# Patient Record
Sex: FEMALE | Race: WHITE
[De-identification: names, ages, dates, MRNs, and addresses within clinical notes are randomized per-mention and may not be internally consistent; named-entity substitution may affect disease eponyms.]

---

## 2018-03-27 ENCOUNTER — HOSPITAL ENCOUNTER (INPATIENT)
Dept: HOSPITAL 74 - JLDR | Age: 23
LOS: 4 days | Discharge: HOME | DRG: 540 | End: 2018-03-31
Attending: OBSTETRICS & GYNECOLOGY | Admitting: OBSTETRICS & GYNECOLOGY
Payer: COMMERCIAL

## 2018-03-27 VITALS — BODY MASS INDEX: 32.5 KG/M2

## 2018-03-27 DIAGNOSIS — Z3A.39: ICD-10-CM

## 2018-03-27 DIAGNOSIS — O34.219: Primary | ICD-10-CM

## 2018-03-27 DIAGNOSIS — Z30.2: ICD-10-CM

## 2018-03-28 PROCEDURE — 0UL70ZZ OCCLUSION OF BILATERAL FALLOPIAN TUBES, OPEN APPROACH: ICD-10-PCS | Performed by: OBSTETRICS & GYNECOLOGY

## 2018-03-28 RX ADMIN — CEFAZOLIN SODIUM SCH MLS/HR: 1 INJECTION, SOLUTION INTRAVENOUS at 17:04

## 2018-03-28 NOTE — HP
Past Medical History





- Primary Care Physician


PCP:: Eduardo La





- Admission


Chief Complaint: pregnancy 39 weeks, previous c/s , request of repeat c/s, 

sterlization


History of Present Illness: 





22 yo f  39 weeks gestation with previous c/s ,requesting repeat c/sand 

BTL , aware small failure risks associated with BTL and risk of ectopic, risks 

and ulternatives has discussed with patient, cx closed , vx -3 mi, fhr cat 1,


History Source: Patient


Limitations to Obtaining History: Language Barrier





- Past Medical History


...: 4


...Para: 3


...Term: 3


...: 0


...Spon : 0


...Induced : 0


...Multiple Gestation: 0


...LMP: 17


... Weeks Gestation by Dates: 39


...EDC by Dates: 18


...EDC by Sono: 18


Heme/Onc: Yes: Anemia





- Past Surgical History


Past Surgical History: Yes: 


Hx Myomectomy: No


Hx Transabdominal Cerclage: No





- Smoking History


Smoking history: Never smoked


Have you smoked in the past 12 months: No


Aproximately how many cigarettes per day: 0





- Alcohol/Substance Use


Hx Alcohol Use: No


History of Substance Use: reports: None





- Social History


History of Recent Travel: No





Home Medications





- Allergies


Allergies/Adverse Reactions: 


 Allergies











Allergy/AdvReac Type Severity Reaction Status Date / Time


 


No Known Allergies Allergy   Verified 18 00:18














- Home Medications


Home Medications: 


Ambulatory Orders





Prenatal Vitamins (Sjr) - 1 tab PO DAILY 16 











Review of Systems





- Review of Systems


Constitutional: reports: No Symptoms


Eyes: reports: No Symptoms


HENT: reports: No Symptoms


Cardiovascular: reports: No Symptoms


Respiratory: reports: No Symptoms


Gastrointestinal: reports: No Symptoms


Genitourinary: reports: No Symptoms


Breasts: reports: No Symptoms Reported


Musculoskeletal: reports: No Symptoms


Integumentary: reports: No Symptoms


Neurological: reports: No Symptoms


Endocrine: reports: No Symptoms


Hematology/Lymphatic: reports: No Symptoms


Psychiatric: reports: No Symptoms





Physical Exam - Maternity


Vital Signs: 


 Vital Signs











Temperature  97.7 F   18 07:00


 


Pulse Rate  102 H  18 07:00


 


Respiratory Rate  18   18 07:00


 


Blood Pressure  106/52   18 07:00


 


O2 Sat by Pulse Oximetry (%)      











Constitutional: Yes: Well Nourished, No Distress, Calm


Eyes: Yes: WNL, Conjunctiva Clear, EOM Intact


HENT: Yes: WNL, Atraumatic, Normocephalic


Neck: Yes: WNL, Supple, Trachea Midline


Cardiovascular: Yes: WNL, Regular Rate and Rhythm


Breast(s): Yes: WNL





- Abdominal Exam/OB


Fundal Height: 40


Number of Fetuses: Single


Fetal Presentation: Vertex


Contractions: No


Intensity: Unaware


Fetal Monitor Mode: External


Fetal Heart Rate Location: [facility]


Category: I


Accelerations: Uniform


Decelerations: None





- Vaginal Exam/OB


Vaginal Bleediing: No


Speculum Exam: No


Dilatation (cm): closed


Effacement (%): 0


Amniotic Membrane Status: Intact


Fetal Presentation: Vertex/Position


Fetal Station: -3





- Physical Exam


Musculoskeletal: Yes: WNL


Extremities: Yes: WNL


Edema: LLE: Trace, RLE: Trace


...Motor Strength: WNL


Psychiatric: Yes: WNL





Hemorrhage Risk Assessment





- Risk Factors


Medium Risk Factors: Yes: Prior , uterine surgery,or multiple 

laparotomies


Risk Score: 1


Risk Level: Medium Risk





Problem List





- Problems


(1) Pregnancy with 39 completed weeks gestation


Code(s): Z3A.39 - 39 WEEKS GESTATION OF PREGNANCY   





(2) Previous  section complicating pregnancy


Code(s): O34.219 - MATERNAL CARE FOR UNSP TYPE SCAR FROM PREVIOUS  DEL 

  





(3) Sterilization


Code(s): Z30.2 - ENCOUNTER FOR STERILIZATION   





Assessment/Plan





repeat c/s, BTL  , rba discussed

## 2018-03-28 NOTE — OP
DATE OF OPERATION:  2018

 

PREOPERATIVE DIAGNOSES:  Pregnancy, 39 weeks.  Three previous  sections. 

Voluntary sterilization.

 

POSTOPERATIVE DIAGNOSES:  Pregnancy, 39 weeks.  Three previous  sections. 

Voluntary sterilization

 

PROCEDURES:  Repeat low-segment, transverse  section and bilateral tubal

ligation.

 

SURGEON:  Dwayne La MD

 

FIRST ASSIST:  CARLOTTA Lim

 

ANESTHESIA:  Spinal.

 

ANESTHESIOLOGIST:  Gris Jefferson MD

 

ESTIMATED BLOOD LOSS:  500 mL.

 

FINDINGS:  A live baby boy, Apgars of 9 and 9, ROT position.

 

OPERATION:  The patient was taken to the operating room and had adequate spinal

anesthesia.  Abdomen and perineum was prepped and draped.  Pfannenstiel abdominal

skin incision was made over previous incision.  Abdominal wall was cut

layer-by-layer.  Anterior peritoneum was exposed and incised.  Upon entering the

abdominal cavity, the low uterine segment was identified and uterovesical fold of

peritoneum established.  Bladder was pushed down.  Then, with the lower blade of the

[place] retracting the pelvis, a low-transverse uterine incision was made.  Incision

extended laterally.  Amniotic sac was entered.  Clear fluid.  Head delivered from

right occiput transverse position.  Nasopharynx was suctioned and a live baby boy was

delivered.  Placenta was delivered manually.  Uterine cavity was cleaned of all

remaining tissue.  Uterine incision was closed in two layers, first layer with 0

Biosyn continuous suture, the second layer with 0 Biosyn imbricating the first layer.

 Bladder flap was closed with 0 Biosyn continuous suture.  Both tubes and ovaries

were checked and normal and no active bleeding was seen.  The right tube was grasped

with a [place] clamp.  The right tube was doubly tied with 2-0 plain.  Portion of

tube was removed.  Endosalpinx was cauterized.  The same procedure repeated for

opposite tube.  All the laparotomy pad, sponge count, instrument counts were correct.

 Peritoneum was closed with 0 Biosyn continuous suture.  Muscle was brought together

with interrupted suture of 0 Biosyn.  Fascia was closed with 0 Biosyn continuous

suture, subcutaneous fat with interrupted suture of 0 Biosyn and the skin was closed

with staples.  Patient tolerated procedure well.  Left the OR in good condition.

 

 

DWAYNE LA M.D. [id number]

DD: 2018 10:39

DT: 2018 11:18

Job #:  [id number]

## 2018-03-29 LAB
BASOPHILS # BLD: 0.3 % (ref 0–2)
DEPRECATED RDW RBC AUTO: 14.5 % (ref 11.6–15.6)
EOSINOPHIL # BLD: 0.3 % (ref 0–4.5)
HCT VFR BLD CALC: 29.4 % (ref 32.4–45.2)
HGB BLD-MCNC: 10 GM/DL (ref 10.7–15.3)
LYMPHOCYTES # BLD: 22.9 % (ref 8–40)
MCH RBC QN AUTO: 29.7 PG (ref 25.7–33.7)
MCHC RBC AUTO-ENTMCNC: 34.1 G/DL (ref 32–36)
MCV RBC: 87.1 FL (ref 80–96)
MONOCYTES # BLD AUTO: 7.7 % (ref 3.8–10.2)
NEUTROPHILS # BLD: 68.8 % (ref 42.8–82.8)
PLATELET # BLD AUTO: 111 K/MM3 (ref 134–434)
PMV BLD: 9.4 FL (ref 7.5–11.1)
RBC # BLD AUTO: 3.38 M/MM3 (ref 3.6–5.2)
WBC # BLD AUTO: 9.3 K/MM3 (ref 4–10)

## 2018-03-29 RX ADMIN — ENOXAPARIN SODIUM SCH MG: 40 INJECTION SUBCUTANEOUS at 09:39

## 2018-03-29 RX ADMIN — IBUPROFEN PRN MG: 600 TABLET, FILM COATED ORAL at 21:25

## 2018-03-29 RX ADMIN — CEFAZOLIN SODIUM SCH MLS/HR: 1 INJECTION, SOLUTION INTRAVENOUS at 02:04

## 2018-03-29 NOTE — PN
Post Partum Progress Note





- Subjective


Subjective: 





22 yo Para 4, status post repeat , seen and evaluated.


She's lying in bed; she c/o incision pain.


Post Partum Day: 1


Type of Delivery: Repeat C/S


Vital Signs: 


 Vital Signs











Temperature  98.0 F   18 01:17


 


Pulse Rate  74   18 01:17


 


Respiratory Rate  20   18 05:00


 


Blood Pressure  98/40   18 01:17


 


O2 Sat by Pulse Oximetry (%)  100   18 11:25











Breast Exam: Yes: Soft


Uterus: Yes: Fundus Firm


Incision: Yes: Dressing dry and intact


Abdomen/GI: Yes: Abdomen soft


Lochia: Yes: Rubra


Lochia, amount: Small


Extremities: Yes: Calves non-tender


Perineum: Yes: Intact


Activity: Other (She's lying in bed)





Problem List





- Problems


(1) Status post repeat low transverse  section


Code(s): Z98.891 - HISTORY OF UTERINE SCAR FROM PREVIOUS SURGERY   





Assessment/Plan





Status post repeat 


Stable


Ambulation


Analgesia as needed


Continue routine post op care

## 2018-03-29 NOTE — PN
Progress Note (short form)





- Note


Progress Note: 





Anesthesia postop note





22 y/o F s/p spinal anesthesia/ duramorph for  section


POD#1, vss, aaox3, pain well controlled, sensory motor intact distally


No anesthesia complications.

## 2018-03-30 RX ADMIN — ENOXAPARIN SODIUM SCH MG: 40 INJECTION SUBCUTANEOUS at 10:01

## 2018-03-30 RX ADMIN — IBUPROFEN PRN MG: 600 TABLET, FILM COATED ORAL at 08:47

## 2018-03-30 RX ADMIN — SIMETHICONE CHEW TAB 80 MG PRN MG: 80 TABLET ORAL at 21:12

## 2018-03-30 RX ADMIN — IBUPROFEN PRN MG: 600 TABLET, FILM COATED ORAL at 21:12

## 2018-03-30 RX ADMIN — SIMETHICONE CHEW TAB 80 MG PRN MG: 80 TABLET ORAL at 15:10

## 2018-03-30 RX ADMIN — IBUPROFEN PRN MG: 600 TABLET, FILM COATED ORAL at 15:11

## 2018-03-30 RX ADMIN — SIMETHICONE CHEW TAB 80 MG PRN MG: 80 TABLET ORAL at 08:46

## 2018-03-30 NOTE — PN
Progress Note (short form)





- Note


Progress Note: 





pod 2 s/p repeat c/s , BTL 


c/o mild incisional pain, passing gas


 CBC, BMP





 18 06:30 





 Last Vital Signs











Temp Pulse Resp BP Pulse Ox


 


 98.5 F   96 H  21   107/69   100 


 


 18 21:57  18 21:57  18 21:57  18 21:57  18 11:25








abdomen soft, no distension, no cva 


incision dry, clean 


no calf tenderness 


lochia mild 


plan ambulate , cbc in am


pain management











Problem List





- Problems


(1) Pregnancy with 39 completed weeks gestation


Code(s): Z3A.39 - 39 WEEKS GESTATION OF PREGNANCY   





(2) Previous  section complicating pregnancy


Code(s): O34.219 - MATERNAL CARE FOR UNSP TYPE SCAR FROM PREVIOUS  DEL 

  





(3) Sterilization


Code(s): Z30.2 - ENCOUNTER FOR STERILIZATION

## 2018-03-30 NOTE — PATH
Surgical Pathology Report



Patient Name:  STACY MEJIA

Accession #:  [id number]

Med. Rec. #:  [id number]                                                        

   /Age/Gender:  1995 (Age: 23) / F

Account:  [id number]                                                          

             Location: [facility] OBS/GYN

Taken:  3/28/2018

Received:  3/29/2018

Reported:  3/30/2018

Physicians:  Eduardo La M.D.

  



Specimen(s) Received

A: PLACENTA 

B: LEFT FALLOPIAN TUBE 

C: RIGHT FALLOPIAN TUBE 





Clinical History

39.4 wks gestation, scheduled repeat  and bilateral tubal ligation







Final Diagnosis

A.  PLACENTA, DELIVERY:

FOCALLY DISRUPTED THIRD TRIMESTER PLACENTA WITH THREE VESSEL UMBILICAL CORD AND

UNREMARKABLE PLACENTAL MEMBRANES. 



B. LEFT FALLOPIAN TUBE, PARTIAL EXCISION:

FULL LUMINAL PORTION OF UNREMARKABLE FALLOPIAN TUBE.



C. RIGHT FALLOPIAN TUBE, PARTIAL EXCISION:

FULL LUMINAL PORTION OF UNREMARKABLE FALLOPIAN TUBE.







***Electronically Signed***

Jesús Darnell M.D.





Gross Description

A.  The specimen is received fresh labeled placenta and is a 670 gram, 17 x 16 x

1.5 cm. placenta with attached membranes and umbilical cord.  The attached

membranes are grey-tan and translucent and insert marginally.  The umbilical

cord measures 40 cm. in length and averages 1.5 cm. in diameter.  The cord

inserts eccentrically, 3 cm. to the nearest margin.  No true knots or strictures

are identified. Cut surface of the umbilical cord reveals 3 vessels. The fetal

surface is grey-blue with minimal fibrin deposition and appropriate caliber

vessels.  The maternal surface is red-brown with focal defects.  Sectioning

reveals red-brown, spongy parenchyma.  No lesions are identified. 

Representative sections are submitted in three cassettes as follows: 1- membrane

rolls and umbilical cord; 2-3- full thickness sections of placenta.



B.  Received fresh labeled "left tube" is a pink tan tubular soft tissue

measuring 1 cm in length with a 0.5 cm diameter. A pinpoint lumen is identified.

The specimen is bisected and entirely submitted in 1 cassette.



C.  Received fresh labeled "right tube" is a pink tan tubular soft tissue

measuring 1 cm in length with a 0.7 cm diameter. A pinpoint lumen is identified.

The specimen is trisected and entirely submitted in 1 cassette. 





staci/3/29/2018

## 2018-03-31 VITALS — DIASTOLIC BLOOD PRESSURE: 65 MMHG | TEMPERATURE: 98.1 F | HEART RATE: 70 BPM | SYSTOLIC BLOOD PRESSURE: 116 MMHG

## 2018-03-31 LAB
BASOPHILS # BLD: 0.3 % (ref 0–2)
DEPRECATED RDW RBC AUTO: 15 % (ref 11.6–15.6)
EOSINOPHIL # BLD: 1.7 % (ref 0–4.5)
HCT VFR BLD CALC: 32 % (ref 32.4–45.2)
HGB BLD-MCNC: 10.8 GM/DL (ref 10.7–15.3)
LYMPHOCYTES # BLD: 23.4 % (ref 8–40)
MCH RBC QN AUTO: 29.8 PG (ref 25.7–33.7)
MCHC RBC AUTO-ENTMCNC: 33.9 G/DL (ref 32–36)
MCV RBC: 88.1 FL (ref 80–96)
MONOCYTES # BLD AUTO: 5.4 % (ref 3.8–10.2)
NEUTROPHILS # BLD: 69.2 % (ref 42.8–82.8)
PLATELET # BLD AUTO: 143 K/MM3 (ref 134–434)
PMV BLD: 8.8 FL (ref 7.5–11.1)
RBC # BLD AUTO: 3.63 M/MM3 (ref 3.6–5.2)
WBC # BLD AUTO: 7.5 K/MM3 (ref 4–10)

## 2018-03-31 RX ADMIN — ENOXAPARIN SODIUM SCH MG: 40 INJECTION SUBCUTANEOUS at 09:03

## 2018-03-31 NOTE — PN
Post Partum Progress Note





- Subjective


Subjective: 


no complains of pain 


voiding without difficulty.





Post Partum Day: 3


Type of Delivery: Repeat C/S


Vital Signs: 


 Vital Signs











Temperature  98.3 F   03/30/18 22:00


 


Pulse Rate  87   03/30/18 22:00


 


Respiratory Rate  20   03/30/18 22:00


 


Blood Pressure  124/66   03/30/18 22:00


 


O2 Sat by Pulse Oximetry (%)  100   03/28/18 11:25











Breast Exam: Yes: Soft, Other (BF).  No: Engorged


Uterus: Yes: Fundus Firm, Fundus below umbilicus


Incision: Yes: Staples intact.  No: Redness, Oozing


Abdomen/GI: Yes: Abdomen soft, Passing flatus (BM done ), Tolerating PO (diet).

  No: Abdominal Distention, Tender


Lochia: Yes: Rubra


Lochia, amount: Moderate


Extremities: Yes: Calves non-tender.  No: Edema


Perineum: Yes: Intact


Activity: Ambulating





- Labs


Labs: 


 CBC











WBC  9.3 K/mm3 (4.0-10.0)  D 03/29/18  06:30    


 


RBC  3.38 M/mm3 (3.60-5.2)  L  03/29/18  06:30    


 


Hgb  10.0 GM/dL (10.7-15.3)  L D 03/29/18  06:30    


 


Hct  29.4 % (32.4-45.2)  L D 03/29/18  06:30    


 


MCV  87.1 fl (80-96)   03/29/18  06:30    


 


MCH  29.7 pg (25.7-33.7)   03/29/18  06:30    


 


MCHC  34.1 g/dl (32.0-36.0)   03/29/18  06:30    


 


RDW  14.5 % (11.6-15.6)   03/29/18  06:30    


 


Plt Count  111 K/MM3 (134-434)  L D 03/29/18  06:30    


 


MPV  9.4 fl (7.5-11.1)   03/29/18  06:30    


 


Neutrophils %  68.8 % (42.8-82.8)   03/29/18  06:30    


 


Lymphocytes %  22.9 % (8-40)  D 03/29/18  06:30    


 


Monocytes %  7.7 % (3.8-10.2)   03/29/18  06:30    


 


Eosinophils %  0.3 % (0-4.5)   03/29/18  06:30    


 


Basophils %  0.3 % (0-2.0)   03/29/18  06:30    














Assessment/Plan


po day#3 stable .


plan discharge today

## 2018-04-03 NOTE — DS
Physical Exam-GYN


Vital Signs: 


 Vital Signs











Temperature  98.1 F   18 09:23


 


Pulse Rate  70   18 09:23


 


Respiratory Rate  18   18 09:23


 


Blood Pressure  116/65   18 09:23


 


O2 Sat by Pulse Oximetry (%)  100   18 11:25











Constitutional: Yes: Well Nourished, No Distress, Calm


Eyes: Yes: WNL, Conjunctiva Clear, EOM Intact


HENT: Yes: WNL, Atraumatic, Normocephalic


Neck: Yes: WNL, Supple, Trachea Midline


Cardiovascular: Yes: WNL, Regular Rate and Rhythm


Respiratory: Yes: WNL, Regular, CTA Bilaterally


Gastrointestinal: Yes: WNL


...Rectal Exam: Yes: WNL


Renal/: Yes: WNL


....Post Partum: Yes: Uterus firm, Uterus non-tender


Breast(s): Yes: WNL


Musculoskeletal: Yes: WNL


Extremities: Yes: WNL


Edema: No


Integumentary: Yes: WNL


Neurological: Yes: WNL, Alert, Oriented


...Motor Strength: WNL


Psychiatric: Yes: WNL, Alert, Oriented


Labs: 


 CBC, BMP





 18 08:15 











Delivery





- Delivery


 Section: Repeat, Low Flap Transverse (no complication)


Type of Anesthesia: Spinal


Episiotomy/Laceration: None


EBL (cc): 500





Delivery, Single Birth





- Stages of Labor


Date of Delivery: 18


Time of Delivery: 09:47


Time Placenta Delivered: 09:48


Placenta: Yes: Expressed





- Condition of Infant


Pediatrician/Neonatologist Present: Yes


Name: Olivia Barnes


Infant Gender: Male


Birth Weight: 8 lb 13 oz


Position: Right, OT


Total Hours ROM (Hrs/Mins): 1min





- Apgar


  ** 1 Minute


Apgar Total Score: 9





  ** 5 Minutes


Apgar Total Score: 9





-  Feeding Plan


Initial Plan: Elected not to breastfeed exclusively throughout hospitalization





Discharge Summary


Reason For Visit: 


Procedures: Principal: repeat LST c/s, BTL


Condition: Good





- Instructions


Diet, Activity, Other Instructions: 


regular diet, follow up [facility] care 1 week, if fever, pain ,heavy bleeding call MD


Referrals: 


Eduardo La MD [Staff Physician] - 


Disposition: HOME





- Home Medications


Comprehensive Discharge Medication List: 


Ambulatory Orders





Prenatal Vitamins (Sjr) - 1 tab PO DAILY 16 


Ibuprofen [Motrin -] 600 mg PO TID #90 tablet 18

## 2023-08-30 ENCOUNTER — HOSPITAL ENCOUNTER (EMERGENCY)
Dept: HOSPITAL 74 - JER | Age: 28
Discharge: HOME | End: 2023-08-30
Payer: COMMERCIAL

## 2023-08-30 VITALS — HEART RATE: 79 BPM | TEMPERATURE: 97.7 F

## 2023-08-30 VITALS — BODY MASS INDEX: 29 KG/M2

## 2023-08-30 VITALS — RESPIRATION RATE: 18 BRPM | DIASTOLIC BLOOD PRESSURE: 71 MMHG | SYSTOLIC BLOOD PRESSURE: 109 MMHG

## 2023-08-30 DIAGNOSIS — R39.15: ICD-10-CM

## 2023-08-30 DIAGNOSIS — N30.00: ICD-10-CM

## 2023-08-30 DIAGNOSIS — M54.50: ICD-10-CM

## 2023-08-30 DIAGNOSIS — Z20.822: ICD-10-CM

## 2023-08-30 DIAGNOSIS — R35.0: ICD-10-CM

## 2023-08-30 DIAGNOSIS — R50.9: Primary | ICD-10-CM

## 2023-08-30 LAB
ALBUMIN SERPL-MCNC: 4 G/DL (ref 3.4–5)
ALP SERPL-CCNC: 66 U/L (ref 45–117)
ALT SERPL-CCNC: 21 U/L (ref 13–61)
ANION GAP SERPL CALC-SCNC: 9 MMOL/L (ref 8–16)
APPEARANCE UR: CLEAR
AST SERPL-CCNC: 21 U/L (ref 15–37)
BACTERIA # UR AUTO: 31 /UL (ref 0–1359)
BASOPHILS # BLD: 0.3 % (ref 0–2)
BILIRUB SERPL-MCNC: 0.7 MG/DL (ref 0.2–1)
BILIRUB UR STRIP.AUTO-MCNC: NEGATIVE MG/DL
BUN SERPL-MCNC: 8.8 MG/DL (ref 7–18)
CALCIUM SERPL-MCNC: 8.7 MG/DL (ref 8.5–10.1)
CASTS URNS QL MICRO: 3 /UL (ref 0–3.1)
CHLORIDE SERPL-SCNC: 103 MMOL/L (ref 98–107)
CO2 SERPL-SCNC: 23 MMOL/L (ref 21–32)
COLOR UR: YELLOW
CREAT SERPL-MCNC: 0.8 MG/DL (ref 0.55–1.3)
DEPRECATED RDW RBC AUTO: 12.8 % (ref 11.6–15.6)
EOSINOPHIL # BLD: 0.2 % (ref 0–4.5)
EPITH CASTS URNS QL MICRO: >36 /UL (ref 0–25.1)
GLUCOSE SERPL-MCNC: 101 MG/DL (ref 74–106)
HCT VFR BLD CALC: 40.1 % (ref 32.4–45.2)
HGB BLD-MCNC: 13.6 GM/DL (ref 10.7–15.3)
KETONES UR QL STRIP: NEGATIVE
LEUKOCYTE ESTERASE UR QL STRIP.AUTO: (no result)
LYMPHOCYTES # BLD: 12.2 % (ref 8–40)
MCH RBC QN AUTO: 29.2 PG (ref 25.7–33.7)
MCHC RBC AUTO-ENTMCNC: 34 G/DL (ref 32–36)
MCV RBC: 85.9 FL (ref 80–96)
MONOCYTES # BLD AUTO: 7.5 % (ref 3.8–10.2)
NEUTROPHILS # BLD: 79.8 % (ref 42.8–82.8)
NITRITE UR QL STRIP: NEGATIVE
PH UR: 5.5 [PH] (ref 5–8)
PLATELET # BLD AUTO: 238 10^3/UL (ref 134–434)
PMV BLD: 8.2 FL (ref 7.5–11.1)
POTASSIUM SERPLBLD-SCNC: 3.9 MMOL/L (ref 3.5–5.1)
PROT SERPL-MCNC: 7.8 G/DL (ref 6.4–8.2)
PROT UR QL STRIP: (no result)
PROT UR QL STRIP: NEGATIVE
RBC # BLD AUTO: 20 /UL (ref 0–23.9)
RBC # BLD AUTO: 4.66 M/MM3 (ref 3.6–5.2)
SODIUM SERPL-SCNC: 136 MMOL/L (ref 136–145)
SP GR UR: 1.02 (ref 1.01–1.03)
UROBILINOGEN UR STRIP-MCNC: 1 MG/DL (ref 0.2–1)
WBC # BLD AUTO: 13.9 K/MM3 (ref 4–10)
WBC # UR AUTO: 179 /UL (ref 0–25.8)

## 2023-08-30 PROCEDURE — 3E03329 INTRODUCTION OF OTHER ANTI-INFECTIVE INTO PERIPHERAL VEIN, PERCUTANEOUS APPROACH: ICD-10-PCS

## 2023-08-30 PROCEDURE — 3E033GC INTRODUCTION OF OTHER THERAPEUTIC SUBSTANCE INTO PERIPHERAL VEIN, PERCUTANEOUS APPROACH: ICD-10-PCS

## 2023-08-30 PROCEDURE — 3E0337Z INTRODUCTION OF ELECTROLYTIC AND WATER BALANCE SUBSTANCE INTO PERIPHERAL VEIN, PERCUTANEOUS APPROACH: ICD-10-PCS
